# Patient Record
Sex: FEMALE | Race: AMERICAN INDIAN OR ALASKA NATIVE
[De-identification: names, ages, dates, MRNs, and addresses within clinical notes are randomized per-mention and may not be internally consistent; named-entity substitution may affect disease eponyms.]

---

## 2020-04-29 ENCOUNTER — HOSPITAL ENCOUNTER (EMERGENCY)
Dept: HOSPITAL 5 - ED | Age: 50
Discharge: HOME | End: 2020-04-29
Payer: COMMERCIAL

## 2020-04-29 VITALS — DIASTOLIC BLOOD PRESSURE: 94 MMHG | SYSTOLIC BLOOD PRESSURE: 154 MMHG

## 2020-04-29 DIAGNOSIS — T78.40XA: ICD-10-CM

## 2020-04-29 DIAGNOSIS — I10: ICD-10-CM

## 2020-04-29 DIAGNOSIS — L50.9: Primary | ICD-10-CM

## 2020-04-29 DIAGNOSIS — Z79.899: ICD-10-CM

## 2020-04-29 PROCEDURE — 99282 EMERGENCY DEPT VISIT SF MDM: CPT

## 2020-04-29 PROCEDURE — 96372 THER/PROPH/DIAG INJ SC/IM: CPT

## 2020-04-29 NOTE — EMERGENCY DEPARTMENT REPORT
ED Rash HPI





- HPI


Chief Complaint: Allergic Reaction


Stated Complaint: POSS ALLERGIC REACTION


Time Seen by Provider: 04/29/20 21:49


Duration: Today


Location: Abdomen, Upper Extremities


Suspected Cause: Other


Rash Symptoms: Yes Itching, No Facial Swelling, No Tongue/Oral Swelling (Perfume

or soap.), No Breathing Difficulties, No Choking Sensation, No Wheezing/Dyspnea,

No Peeling, No Blistering, No Fever, No Lightheaded, No Malaise, No Myalgias


Severity: moderate


Other History: 50-year-old -American female presents to the emergency 

room complaining of hives when trying a new perfume or new soap.  Patient states

that it started today.  Patient reports that she took Benadryl approximately 6 

PM.  Patient denies any shortness of breath no swelling of the tongue no 

difficulty swallowing no difficulty breathing no chest pain no nausea no 

vomiting no diarrhea.  Patient reports she has no known drug allergies she does 

not have a primary care provider.  Patient states this happened in the past and 

she had to come and get a steroid injection.





ED Review of Systems


ROS: 


Stated complaint: POSS ALLERGIC REACTION


Other details as noted in HPI





Comment: All other systems reviewed and negative


ENT: denies: throat pain


Respiratory: denies: cough, shortness of breath, SOB with exertion, SOB at rest,

wheezing


Cardiovascular: denies: chest pain, palpitations


Gastrointestinal: denies: nausea, vomiting, diarrhea, constipation


Neurological: denies: headache, weakness, paresthesias





ED Past Medical Hx





- Past Medical History


Previous Medical History?: Yes


Hx Hypertension: Yes





- Social History


Smoking Status: Never Smoker


Substance Use Type: None





- Medications


Home Medications: 


                                Home Medications











 Medication  Instructions  Recorded  Confirmed  Last Taken  Type


 


Cetirizine HCl [Zyrtec 10mg tab] 10 mg PO QDAY 5 Days #5 tablet 04/29/20  

Unknown Rx


 


Famotidine [Pepcid] 20 mg PO BID 5 Days #10 tablet 04/29/20  Unknown Rx


 


predniSONE [Deltasone] 20 mg PO QDAY 5 Days #5 tab 04/29/20  Unknown Rx














Rash Exam





- Exam


General: 


Vital signs noted. No distress. Alert and acting appropriately.








ED Course


                                   Vital Signs











  04/29/20





  21:34


 


Temperature 98.3 F


 


Pulse Rate 93 H


 


Respiratory 18





Rate 


 


Blood Pressure 154/94


 


O2 Sat by Pulse 100





Oximetry 














ED Medical Decision Making





- Medical Decision Making





50-year-old -American female presents to the emergency room complaining 

of hives when trying a new perfume or new soap.  Patient states that it started 

today.  Patient reports that she took Benadryl approximately 6 PM.  Patient 

denies any shortness of breath no swelling of the tongue no difficulty 

swallowing no difficulty breathing no chest pain no nausea no vomiting no 

diarrhea.  Patient reports she has no known drug allergies she does not have a 

primary care provider.  Patient states this happened in the past and she had to 

come and get a steroid injection.








Patient be given a dexamethasone 10 mg IV M and 20 mg of famotidine p.o. patient

 will be discharged home on prednisone 40 mg p.o. daily for the next 5 days.  

Patient is instructed to try taking Claritin over-the-counter 10 mg daily for 

the next 5 days and famotidine 20 mg p.o. twice daily for the next 5 days


Critical care attestation.: 


If time is entered above; I have spent that time in minutes in the direct care 

of this critically ill patient, excluding procedure time.








ED Disposition


Clinical Impression: 


 Urticaria, Allergic reaction





Disposition: DC-01 TO HOME OR SELFCARE


Is pt being admited?: No


Does the pt Need Aspirin: No


Condition: Stable


Instructions:  Urticaria (ED)


Additional Instructions: 


Please take medications as prescribed.  Follow-up with an allergist or primary 

care provider.


Prescriptions: 


predniSONE [Deltasone] 20 mg PO QDAY 5 Days #5 tab


Famotidine [Pepcid] 20 mg PO BID 5 Days #10 tablet


Cetirizine HCl [Zyrtec 10mg tab] 10 mg PO QDAY 5 Days #5 tablet


Referrals: 


PRIMARY CARE,MD [Primary Care Provider] - 3-5 Days


[facility] [Outside] - 3-5 Days

## 2020-07-13 ENCOUNTER — HOSPITAL ENCOUNTER (EMERGENCY)
Dept: HOSPITAL 5 - ED | Age: 50
LOS: 1 days | Discharge: HOME | End: 2020-07-14
Payer: COMMERCIAL

## 2020-07-13 DIAGNOSIS — Z79.2: ICD-10-CM

## 2020-07-13 DIAGNOSIS — I10: ICD-10-CM

## 2020-07-13 DIAGNOSIS — K05.00: ICD-10-CM

## 2020-07-13 DIAGNOSIS — K04.7: Primary | ICD-10-CM

## 2020-07-13 DIAGNOSIS — K02.9: ICD-10-CM

## 2020-07-13 DIAGNOSIS — Z79.899: ICD-10-CM

## 2020-07-13 PROCEDURE — 99282 EMERGENCY DEPT VISIT SF MDM: CPT

## 2020-07-13 PROCEDURE — 96372 THER/PROPH/DIAG INJ SC/IM: CPT

## 2020-07-14 VITALS — DIASTOLIC BLOOD PRESSURE: 65 MMHG | SYSTOLIC BLOOD PRESSURE: 132 MMHG

## 2020-07-14 NOTE — EMERGENCY DEPARTMENT REPORT
ED General Adult HPI





- General


Chief complaint: Dental/Oral


Stated complaint: RT SIDE TOOTHACHE/JAW SWELLING


Source: patient


Mode of arrival: Ambulatory


Limitations: No Limitations





- History of Present Illness


Initial comments: 





Patient is a 50-year-old -American female with a history of hypertension 

presents to the ED with complaint of acute onset persistent severe right 

mandibular premolar molar toothaches with swollen gums for the last 2 days.  

Patient states that he has been having persistent pain especially in the last 12

hours and has been taking over-the-counter medication with no relief.  Patient 

denies dizziness, syncope, chest pain, shortness of breath, fever, chills, 

cough, headache, sore throat or nausea and vomiting or abdominal pain.


MD Complaint: Right mandibular premolar and molar toothache; swollen right 

mandible


-: Sudden, days(s) (2)


Location: mouth (right mandibular swelling)


Radiation: non-radiation


Severity scale (0 -10): 6


Quality: aching


Consistency: constant


Improves with: none


Worsens with: none


Associated Symptoms: denies other symptoms, loss of appetite, malaise.  denies: 

confusion, chest pain, cough, diaphoresis, fever/chills, headaches, 

nausea/vomiting, rash, seizure, shortness of breath, syncope, weakness, other


Treatments Prior to Arrival: none





- Related Data


                                  Previous Rx's











 Medication  Instructions  Recorded  Last Taken  Type


 


Cetirizine HCl [Zyrtec 10mg tab] 10 mg PO QDAY 5 Days #5 tablet 04/29/20 Unknown

Rx


 


Famotidine [Pepcid] 20 mg PO BID 5 Days #10 tablet 04/29/20 Unknown Rx


 


predniSONE [Deltasone] 20 mg PO QDAY 5 Days #5 tab 04/29/20 Unknown Rx


 


Acetaminophen/Codeine [Tylenol 1 tab PO Q6H PRN #12 tab 07/14/20 Unknown Rx





/Codeine # 3 tab]    


 


Clindamycin [Clindamycin CAP] 300 mg PO Q8HR #60 capsule 07/14/20 Unknown Rx


 


Ketorolac [Toradol] 10 mg PO Q8H PRN #20 tablet 07/14/20 Unknown Rx











                                    Allergies











Allergy/AdvReac Type Severity Reaction Status Date / Time


 


No Known Allergies Allergy   Verified 07/13/20 22:39














ED Review of Systems


ROS: 


Stated complaint: RT SIDE TOOTHACHE/JAW SWELLING


Other details as noted in HPI





Constitutional: denies: chills, fever


Eyes: denies: eye pain, eye discharge, vision change


ENT: dental pain (right mandibular premolar toothache; swollen gums).  denies: 

ear pain, throat pain


Respiratory: denies: cough, shortness of breath, wheezing


Cardiovascular: denies: chest pain, palpitations


Endocrine: no symptoms reported


Gastrointestinal: denies: abdominal pain, nausea, diarrhea


Genitourinary: denies: urgency, dysuria, discharge


Musculoskeletal: denies: back pain, joint swelling, arthralgia


Skin: denies: rash, lesions


Neurological: denies: headache, weakness, paresthesias


Psychiatric: denies: anxiety, depression


Hematological/Lymphatic: denies: easy bleeding, easy bruising





ED Past Medical Hx





- Past Medical History


Hx Hypertension: Yes





- Surgical History


Past Surgical History?: No





- Social History


Smoking Status: Never Smoker


Substance Use Type: None





- Medications


Home Medications: 


                                Home Medications











 Medication  Instructions  Recorded  Confirmed  Last Taken  Type


 


Cetirizine HCl [Zyrtec 10mg tab] 10 mg PO QDAY 5 Days #5 tablet 04/29/20  

Unknown Rx


 


Famotidine [Pepcid] 20 mg PO BID 5 Days #10 tablet 04/29/20  Unknown Rx


 


predniSONE [Deltasone] 20 mg PO QDAY 5 Days #5 tab 04/29/20  Unknown Rx


 


Acetaminophen/Codeine [Tylenol 1 tab PO Q6H PRN #12 tab 07/14/20  Unknown Rx





/Codeine # 3 tab]     


 


Clindamycin [Clindamycin CAP] 300 mg PO Q8HR #60 capsule 07/14/20  Unknown Rx


 


Ketorolac [Toradol] 10 mg PO Q8H PRN #20 tablet 07/14/20  Unknown Rx














ED Physical Exam





- General


Limitations: No Limitations


General appearance: alert, in no apparent distress





- Head


Head exam: Present: atraumatic, normocephalic, normal inspection





- Eye


Eye exam: Present: normal appearance, PERRL, EOMI


Pupils: Present: normal accommodation





- ENT


ENT exam: Present: mucous membranes moist, TM's normal bilaterally, normal 

external ear exam, other (Swollen, severely tender right mandibular gingiva; 

severely tender right mandibular premolar and molar tenderness)





- Neck


Neck exam: Present: normal inspection, full ROM





- Respiratory


Respiratory exam: Present: normal lung sounds bilaterally.  Absent: respiratory 

distress, wheezes, rales, stridor, chest wall tenderness, accessory muscle use, 

decreased breath sounds





- Cardiovascular


Cardiovascular Exam: Present: regular rate, normal rhythm, normal heart sounds. 

 Absent: systolic murmur, diastolic murmur, rubs, gallop





- GI/Abdominal


GI/Abdominal exam: Present: soft, normal bowel sounds.  Absent: tenderness, 

guarding, hyperactive bowel sounds, hypoactive bowel sounds





- Extremities Exam


Extremities exam: Present: normal inspection, full ROM, normal capillary refill





- Back Exam


Back exam: Present: normal inspection, full ROM.  Absent: tenderness, CVA 

tenderness (R), CVA tenderness (L), muscle spasm, paraspinal tenderness, 

vertebral tenderness





- Neurological Exam


Neurological exam: Present: alert, oriented X3, CN II-XII intact, normal gait, 

reflexes normal





- Psychiatric


Psychiatric exam: Present: normal affect, normal mood





- Skin


Skin exam: Present: warm, dry, intact, normal color.  Absent: rash





ED Course





                                   Vital Signs











  07/13/20





  22:40


 


Temperature 98.4 F


 


Pulse Rate 87


 


Respiratory 18





Rate 


 


Blood Pressure 142/92


 


O2 Sat by Pulse 100





Oximetry 














ED Medical Decision Making





- Medical Decision Making





This is a 50-year-old -American female with a history of hypertension 

presents to the ED with complaint of acute onset persistent severe right 

mandibular premolar molar toothaches with swollen gums for the last 2 days.  

Patient states that he has been having persistent pain especially in the last 12

 hours and has been taking over-the-counter medication with no relief. In the 

ED, patient is alert and oriented x3, and is in no acute distress, but appears 

to be in severe pain. Patient was treated for pain and given initial oral 

antibiotics. Patient was discharged home on medications and advised to follow up

 with her Dentist in 7-10 days for reevaluation. Patient was advised to return 

to the ED immediately if symptoms get worse.





- Differential Diagnosis


dental abscess; gingivitis; Dental caries


Critical care attestation.: 


If time is entered above; I have spent that time in minutes in the direct care 

of this critically ill patient, excluding procedure time.








ED Disposition


Clinical Impression: 


 Dental abscess, Acute gingivitis, Dental caries





Disposition: DC-01 TO HOME OR SELFCARE


Is pt being admited?: No


Does the pt Need Aspirin: No


Condition: Stable


Instructions:  Dental Abscess (ED), Gingivitis (ED), Dental Caries (ED)


Additional Instructions: 


Take medication with food, drink plenty fluids and follow-up with your dentist 

in 7 to 10 days for reevaluation.  Return to the ED immediately if symptoms get 

worse.


Prescriptions: 


Clindamycin [Clindamycin CAP] 300 mg PO Q8HR #60 capsule


Ketorolac [Toradol] 10 mg PO Q8H PRN #20 tablet


 PRN Reason: Pain


Acetaminophen/Codeine [Tylenol /Codeine # 3 tab] 1 tab PO Q6H PRN #12 tab


 PRN Reason: Pain , Severe (7-10)


Referrals: 


[facility] [Outside] - 7-10 days


[facility] [Outside] - 7-10 days


Time of Disposition: 04:35


Print Language: ENGLISH